# Patient Record
Sex: FEMALE | Race: WHITE | NOT HISPANIC OR LATINO | URBAN - METROPOLITAN AREA
[De-identification: names, ages, dates, MRNs, and addresses within clinical notes are randomized per-mention and may not be internally consistent; named-entity substitution may affect disease eponyms.]

---

## 2022-04-05 ENCOUNTER — TRANSCRIBE ORDERS (OUTPATIENT)
Dept: ADMINISTRATIVE | Facility: HOSPITAL | Age: 57
End: 2022-04-05

## 2022-04-05 DIAGNOSIS — K74.69 OTHER CIRRHOSIS OF LIVER: Primary | ICD-10-CM

## 2022-05-03 ENCOUNTER — TRANSCRIBE ORDERS (OUTPATIENT)
Dept: ADMINISTRATIVE | Facility: HOSPITAL | Age: 57
End: 2022-05-03

## 2022-05-03 DIAGNOSIS — K74.60 HEPATIC CIRRHOSIS, UNSPECIFIED HEPATIC CIRRHOSIS TYPE, UNSPECIFIED WHETHER ASCITES PRESENT: Primary | ICD-10-CM

## 2022-05-05 ENCOUNTER — APPOINTMENT (OUTPATIENT)
Dept: MRI IMAGING | Facility: HOSPITAL | Age: 57
End: 2022-05-05

## 2022-06-09 ENCOUNTER — APPOINTMENT (OUTPATIENT)
Dept: MRI IMAGING | Facility: HOSPITAL | Age: 57
End: 2022-06-09

## 2024-05-14 ENCOUNTER — HOSPITAL ENCOUNTER (OUTPATIENT)
Dept: OTHER | Facility: HOSPITAL | Age: 59
Discharge: HOME | End: 2024-05-14

## 2024-05-15 ENCOUNTER — OFFICE VISIT (OUTPATIENT)
Dept: NEUROSURGERY | Facility: CLINIC | Age: 59
End: 2024-05-15
Payer: MEDICARE

## 2024-05-15 VITALS
SYSTOLIC BLOOD PRESSURE: 104 MMHG | HEART RATE: 74 BPM | BODY MASS INDEX: 34.95 KG/M2 | HEIGHT: 60 IN | WEIGHT: 178 LBS | DIASTOLIC BLOOD PRESSURE: 82 MMHG

## 2024-05-15 DIAGNOSIS — M54.42 CHRONIC MIDLINE LOW BACK PAIN WITH BILATERAL SCIATICA: ICD-10-CM

## 2024-05-15 DIAGNOSIS — Z98.1 HISTORY OF FUSION OF CERVICAL SPINE: ICD-10-CM

## 2024-05-15 DIAGNOSIS — M48.02 SPINAL STENOSIS IN CERVICAL REGION: ICD-10-CM

## 2024-05-15 DIAGNOSIS — M47.816 LUMBAR FACET ARTHROPATHY: ICD-10-CM

## 2024-05-15 DIAGNOSIS — M54.41 CHRONIC MIDLINE LOW BACK PAIN WITH BILATERAL SCIATICA: ICD-10-CM

## 2024-05-15 DIAGNOSIS — M51.36 DDD (DEGENERATIVE DISC DISEASE), LUMBAR: Primary | ICD-10-CM

## 2024-05-15 DIAGNOSIS — G89.29 CHRONIC MIDLINE LOW BACK PAIN WITH BILATERAL SCIATICA: ICD-10-CM

## 2024-05-15 DIAGNOSIS — M50.222 HERNIATED NUCLEUS PULPOSUS, C5-6: ICD-10-CM

## 2024-05-15 DIAGNOSIS — R20.0 BILATERAL NUMBNESS AND TINGLING OF ARMS AND LEGS: ICD-10-CM

## 2024-05-15 DIAGNOSIS — R26.89 STUMBLING GAIT: ICD-10-CM

## 2024-05-15 DIAGNOSIS — R20.2 BILATERAL NUMBNESS AND TINGLING OF ARMS AND LEGS: ICD-10-CM

## 2024-05-15 PROCEDURE — 1160F RVW MEDS BY RX/DR IN RCRD: CPT | Performed by: PHYSICIAN ASSISTANT

## 2024-05-15 PROCEDURE — 1159F MED LIST DOCD IN RCRD: CPT | Performed by: PHYSICIAN ASSISTANT

## 2024-05-15 PROCEDURE — 99204 OFFICE O/P NEW MOD 45 MIN: CPT | Performed by: PHYSICIAN ASSISTANT

## 2024-05-15 RX ORDER — OMEPRAZOLE 40 MG/1
40 CAPSULE, DELAYED RELEASE ORAL
COMMUNITY
Start: 2024-05-08

## 2024-05-15 RX ORDER — DESVENLAFAXINE SUCCINATE 50 MG/1
50 TABLET, EXTENDED RELEASE ORAL DAILY
COMMUNITY
Start: 2024-04-18 | End: 2025-04-19

## 2024-05-15 RX ORDER — NAPROXEN 500 MG/1
500 TABLET ORAL
COMMUNITY

## 2024-05-15 RX ORDER — LANOLIN ALCOHOL/MO/W.PET/CERES
400 CREAM (GRAM) TOPICAL DAILY
COMMUNITY
Start: 2023-09-25 | End: 2024-09-25

## 2024-05-15 RX ORDER — PENICILLIN V POTASSIUM 500 MG/1
TABLET ORAL
COMMUNITY

## 2024-05-15 RX ORDER — BUSPIRONE HYDROCHLORIDE 15 MG/1
1 TABLET ORAL 2 TIMES DAILY
COMMUNITY

## 2024-05-15 RX ORDER — ONDANSETRON HYDROCHLORIDE 8 MG/1
8 TABLET, FILM COATED ORAL
COMMUNITY
Start: 2024-04-18 | End: 2024-06-18

## 2024-05-15 RX ORDER — OXYCODONE HYDROCHLORIDE 10 MG/1
10 TABLET ORAL
COMMUNITY
Start: 2024-02-26

## 2024-05-15 RX ORDER — CYCLOBENZAPRINE HCL 10 MG
TABLET ORAL
COMMUNITY
Start: 2024-02-26

## 2024-05-15 RX ORDER — SEMAGLUTIDE 1.34 MG/ML
INJECTION, SOLUTION SUBCUTANEOUS
COMMUNITY
Start: 2024-05-09

## 2024-05-15 RX ORDER — GABAPENTIN 800 MG/1
TABLET ORAL
COMMUNITY
Start: 2024-02-26

## 2024-05-15 RX ORDER — LANOLIN ALCOHOL/MO/W.PET/CERES
1000 CREAM (GRAM) TOPICAL DAILY
COMMUNITY
Start: 2023-09-27 | End: 2024-09-27

## 2024-05-15 RX ORDER — TRAZODONE HYDROCHLORIDE 100 MG/1
100 TABLET ORAL
COMMUNITY
Start: 2024-04-17

## 2024-05-15 NOTE — PROGRESS NOTES
"Chief Complaint  Back Pain, Hip Pain (Bilateral ), Leg Pain (Bilateral hips to above knees), Numbness (Bilateral feet), Tingling (Bilateral feet ), and Extremity Weakness (Bilateral legs )    Subjective          Caro Kevin who is a 58 y.o. year old female who presents to [facility] for Evaluation of the Spine.     The patient complains of pain located in the Lumbar Spine.  Patients states the pain has been present for some time.  The pain came on gradually.  The pain scaled level is 9.  The pain does radiate. Dermatomes are located bilaterally Lumbar at: not below the knee.  The pain in the back is constant and waxing/waning, leg pain prompted by movement and standing up. Pain  described as sharp.  The pain is worse at no particular time of day. Patient states Pain Medication and Gabapentin makes the pain better.  Patient states  standing, walking, trying to do anything that requires balancing on the legs makes the pain worse.    Associated Symptoms Include: Numbness and Tingling in both legs/feet. Weakness in the bilateral lower extremities mostly when standing. \"Stumbling\" when walking - ongoing for the past 1.5 years.  Conservative Interventions Include: Pain Medications that were somewhat effective. and Gabapentin that was somewhat effective. NSAIDs and Muscle relaxers somewhat effective. Physical therapy was ineffective. Spinal epidurals in the past were ineffective. TPI were somewhat effective.    Was this the result of an injury or accident? : No    History of Previous Spinal Surgery?: Yes.  Cervical, Date 2007, C3-C4 (she thinks).     reports that she has never smoked. She does not have any smokeless tobacco history on file.    Review of Systems   Musculoskeletal:  Positive for back pain.        Objective   Vital Signs:   /82 (BP Location: Right arm, Patient Position: Sitting, Cuff Size: Adult)   Pulse 74   Ht 152.4 cm (60\")   Wt 80.7 kg (178 lb)   BMI " 34.76 kg/m²       Physical Exam  Constitutional:       Appearance: Normal appearance. She is obese.   Neck:      Comments: Pain with ROM  Pulmonary:      Effort: Pulmonary effort is normal.   Musculoskeletal:         General: Tenderness (midline and bilateral lumbar and cervical areas) present.      Comments: Tinel's test positive at right wrist and elbow,  Rodriguez's and clonus negative bilaterally   Neurological:      General: No focal deficit present.      Mental Status: She is alert and oriented to person, place, and time.      Sensory: Sensory deficit (reduced in the thighs bilaterally) present.      Motor: No weakness.      Deep Tendon Reflexes: Reflexes normal.   Psychiatric:         Mood and Affect: Mood normal.         Behavior: Behavior normal.        Neurologic Exam     Mental Status   Oriented to person, place, and time.        Result Review     I have personally interpreted the MRI of the lumbar spine without contrast from 8/30/2023 which shows mild multilevel spondylosis with multilevel facet arthritis.  There is no significant central canal or foraminal stenosis.  There is mild foraminal stenosis most notable on the left at L4-L5.    I have personally interpreted the MRI of the cervical spine without contrast from 8/30/23 which shows previous ACDF at C3-C4 and C4-C5. There is disc bulging at C5-C6 which may contact the spinal cord. There is questionable signal change in the spinal cord just below this level on the sagittal view - there were no axial views included.     Assessment and Plan    Diagnoses and all orders for this visit:    1. DDD (degenerative disc disease), lumbar (Primary)    2. Lumbar facet arthropathy    3. Chronic midline low back pain with bilateral sciatica    4. Stumbling gait  -     MRI Cervical Spine Without Contrast; Future    5. Bilateral numbness and tingling of arms and legs  -     MRI Cervical Spine Without Contrast; Future    6. History of fusion of cervical spine  -     MRI  "Cervical Spine Without Contrast; Future    7. Herniated nucleus pulposus, C5-6  -     MRI Cervical Spine Without Contrast; Future    8. Spinal stenosis in cervical region  -     MRI Cervical Spine Without Contrast; Future    There is not significant stenosis in the lumbar spine to recommend a surgical approach.    She may consider spinal injections in the lumbar spine to assess benefit for her leg complaints.    She does complain of \"stumbling\" when she walks and new numbness/tingling complaints in the arms and legs. She has history of C3-C5 fusion and now has disc bulging at C5-C6 which may contact the spinal cord. There may be some signal change in the spinal cord at this level in the sagittal view - but the cannot be confirmed in an axial view as the axial images are not provided.    Given her complaints and history of surgery, I would like to order a new MRI fo the cervical spine to assess with axial images the spinal cord at C5-C6 to further evaluate for myelomalacia.    She has no signs of myelopathy on examination today.    She will follow-up in 4 weeks to reassess and review imaging, sooner if needed.    Follow Up   Return in about 4 weeks (around 6/12/2024).  Patient was given instructions and counseling regarding her condition or for health maintenance advice. Please see specific information pulled into the AVS if appropriate.        "

## 2024-05-17 ENCOUNTER — PATIENT ROUNDING (BHMG ONLY) (OUTPATIENT)
Dept: NEUROSURGERY | Facility: CLINIC | Age: 59
End: 2024-05-17
Payer: MEDICARE

## 2024-06-17 ENCOUNTER — HOSPITAL ENCOUNTER (OUTPATIENT)
Dept: MRI IMAGING | Facility: HOSPITAL | Age: 59
Discharge: HOME | End: 2024-06-17
Admitting: PHYSICIAN ASSISTANT
Payer: MEDICARE

## 2024-06-17 DIAGNOSIS — R26.89 STUMBLING GAIT: ICD-10-CM

## 2024-06-17 DIAGNOSIS — M50.222 HERNIATED NUCLEUS PULPOSUS, C5-6: ICD-10-CM

## 2024-06-17 DIAGNOSIS — M48.02 SPINAL STENOSIS IN CERVICAL REGION: ICD-10-CM

## 2024-06-17 DIAGNOSIS — R20.0 BILATERAL NUMBNESS AND TINGLING OF ARMS AND LEGS: ICD-10-CM

## 2024-06-17 DIAGNOSIS — Z98.1 HISTORY OF FUSION OF CERVICAL SPINE: ICD-10-CM

## 2024-06-17 DIAGNOSIS — R20.2 BILATERAL NUMBNESS AND TINGLING OF ARMS AND LEGS: ICD-10-CM

## 2024-06-17 PROCEDURE — 72141 MRI NECK SPINE W/O DYE: CPT

## 2024-06-18 ENCOUNTER — OFFICE VISIT (OUTPATIENT)
Dept: NEUROSURGERY | Facility: CLINIC | Age: 59
End: 2024-06-18
Payer: MEDICARE

## 2024-06-18 VITALS
HEIGHT: 60 IN | DIASTOLIC BLOOD PRESSURE: 71 MMHG | BODY MASS INDEX: 34.16 KG/M2 | SYSTOLIC BLOOD PRESSURE: 133 MMHG | WEIGHT: 174 LBS | HEART RATE: 68 BPM

## 2024-06-18 DIAGNOSIS — M48.02 SPINAL STENOSIS IN CERVICAL REGION: ICD-10-CM

## 2024-06-18 DIAGNOSIS — Z98.1 HISTORY OF FUSION OF CERVICAL SPINE: ICD-10-CM

## 2024-06-18 DIAGNOSIS — M50.222 HERNIATED NUCLEUS PULPOSUS, C5-6: Primary | ICD-10-CM

## 2024-06-18 PROCEDURE — 1160F RVW MEDS BY RX/DR IN RCRD: CPT | Performed by: PHYSICIAN ASSISTANT

## 2024-06-18 PROCEDURE — 1159F MED LIST DOCD IN RCRD: CPT | Performed by: PHYSICIAN ASSISTANT

## 2024-06-18 PROCEDURE — 99213 OFFICE O/P EST LOW 20 MIN: CPT | Performed by: PHYSICIAN ASSISTANT

## 2024-06-18 NOTE — PROGRESS NOTES
"Patient being seen for today for Follow-up  .    Subjective    Caro Kevin is a 59 y.o. female that presents with Follow-up  .    HPI  Previously: Last seen on 5/15/2024 for complaints of back pain extending bilaterally not below the knees.  There is no significant stenosis in the lumbar spine to recommend surgery.  We discussed possible spinal injections.  She complained of \"stumbling\" when she walked with history of fusion at C3-C5 and current disc bulging at C5-C6 possibly contacting the spinal cord.  There was questionable signal change in sagittal view but axial images were not provided.  There was an order for new MRI of the cervical spine to fully assess the cervical spine changes and evaluate for myelomalacia.  There were no signs of myelopathy on her examination in the clinic.    Today: She denies any new complaints today.    She reports maybe worse weakness in legs and being off balance.    She has pain in the arms and hands to all the fingers.     reports that she has never smoked. She does not have any smokeless tobacco history on file.    Review of Systems   Musculoskeletal:  Positive for back pain and neck pain.   Neurological:  Positive for weakness and numbness.       Objective   Vitals:    06/18/24 1325   BP: 133/71   Pulse: 68        Physical Exam  Constitutional:       Appearance: Normal appearance. She is obese.   Neck:      Comments: Pain with ROM  Pulmonary:      Effort: Pulmonary effort is normal.   Musculoskeletal:         General: Tenderness (midline and bilateral cervical area) present.      Comments: Tinel's positive at the bilateral wrists, and right elbows.  Hoffmans' negative bilaterally,  Clonus negative bilaterally   Neurological:      General: No focal deficit present.      Mental Status: She is alert and oriented to person, place, and time.      Sensory: No sensory deficit.      Motor: No weakness.      Deep Tendon Reflexes: Reflexes normal.   Psychiatric:         Mood and Affect: " Mood normal.         Behavior: Behavior normal.          Result Review   I have personally interpreted the MRI of cervical spine without contrast from 6/17/2024 which shows previous ACDF from C3-C5.  There appears to be disc bulging at C5-C6 with central canal narrowing and possibly spinal cord signal change.     Assessment and Plan {CC Problem List  Visit Diagnosis  ROS  Review (Popup)  [facility]  Quality  BestPractice  Medications  SmartSets  SnapShot Encounters  Media :23}   Diagnoses and all orders for this visit:    1. Herniated nucleus pulposus, C5-6 (Primary)  -     EMG & Nerve Conduction Test; Future    2. Spinal stenosis in cervical region  -     EMG & Nerve Conduction Test; Future    3. History of fusion of cervical spine  -     EMG & Nerve Conduction Test; Future    She has pain in the neck and both arms to all the fingers.    She reports stumbling while walking at times.    She did compete new MRI which does appear to show stenosis at C5-C6 below her fusion level. There is questionable spinal cord signal change at this level.    She has no signs of myelopathy on examination today. We are waiting on the radiologist to finalize their report and I will contact her with those findings once available. I did offer her discussion with Dr. Joshi about possible surgical approach, which she is deferring today in favor of further testing.    She does have positive tinel's testing bilateral wrists and the right elbow. I will order NCV/EMG testing of the bilateral upper extremities.    She will follow-up after NCV/EMG testing to re-evaluate, sooner if needed. I will notify her of the radiology findings for the cervical MRI - if the radiologist agrees that there is signal change, we will discuss earlier follow-up to consult Dr. Joshi about surgery if she is agreeable.    Follow Up {Instructions Charge Capture  Follow-up Communications :23}   Return in about 4 weeks (around 7/16/2024).

## 2024-06-19 ENCOUNTER — TELEPHONE (OUTPATIENT)
Dept: NEUROSURGERY | Facility: CLINIC | Age: 59
End: 2024-06-19
Payer: MEDICARE

## 2024-06-19 NOTE — TELEPHONE ENCOUNTER
Caro has been advised that the radiologist did finalize their report for her neck MRI and they do agree that there is spinal cord signal change at the C5-C6 level below her previous fusion.     As we discussed at her office visit earlier, she can consider follow-up to discuss a surgical approach with Dr. Joshi if she would like.    Caro danica like to follow-up to discuss a surgucal approach with Dr. Joshi. Patient is aware that she is on Jonathan Lopez's schedule and she will see Dr. Joshi between his patients.

## 2024-06-26 ENCOUNTER — HOSPITAL ENCOUNTER (OUTPATIENT)
Facility: HOSPITAL | Age: 59
Discharge: HOME | End: 2024-06-26
Admitting: PHYSICIAN ASSISTANT
Payer: MEDICARE

## 2024-06-26 DIAGNOSIS — Z98.1 HISTORY OF FUSION OF CERVICAL SPINE: ICD-10-CM

## 2024-06-26 DIAGNOSIS — M50.222 HERNIATED NUCLEUS PULPOSUS, C5-6: ICD-10-CM

## 2024-06-26 DIAGNOSIS — M48.02 SPINAL STENOSIS IN CERVICAL REGION: ICD-10-CM

## 2024-06-26 PROCEDURE — 95886 MUSC TEST DONE W/N TEST COMP: CPT

## 2024-06-26 PROCEDURE — 95911 NRV CNDJ TEST 9-10 STUDIES: CPT

## 2024-06-27 ENCOUNTER — HOSPITAL ENCOUNTER (OUTPATIENT)
Dept: GENERAL RADIOLOGY | Facility: HOSPITAL | Age: 59
Discharge: HOME | End: 2024-06-27
Admitting: NEUROLOGICAL SURGERY
Payer: MEDICARE

## 2024-06-27 ENCOUNTER — OFFICE VISIT (OUTPATIENT)
Dept: NEUROSURGERY | Facility: CLINIC | Age: 59
End: 2024-06-27
Payer: MEDICARE

## 2024-06-27 VITALS
DIASTOLIC BLOOD PRESSURE: 98 MMHG | WEIGHT: 175 LBS | BODY MASS INDEX: 34.36 KG/M2 | HEART RATE: 91 BPM | SYSTOLIC BLOOD PRESSURE: 130 MMHG | HEIGHT: 60 IN

## 2024-06-27 DIAGNOSIS — Z98.1 HISTORY OF FUSION OF CERVICAL SPINE: ICD-10-CM

## 2024-06-27 DIAGNOSIS — M50.222 HERNIATED NUCLEUS PULPOSUS, C5-6: Primary | ICD-10-CM

## 2024-06-27 DIAGNOSIS — M48.02 SPINAL STENOSIS IN CERVICAL REGION: ICD-10-CM

## 2024-06-27 PROCEDURE — 1159F MED LIST DOCD IN RCRD: CPT | Performed by: PHYSICIAN ASSISTANT

## 2024-06-27 PROCEDURE — 72040 X-RAY EXAM NECK SPINE 2-3 VW: CPT

## 2024-06-27 PROCEDURE — 1160F RVW MEDS BY RX/DR IN RCRD: CPT | Performed by: PHYSICIAN ASSISTANT

## 2024-06-27 PROCEDURE — 99214 OFFICE O/P EST MOD 30 MIN: CPT | Performed by: PHYSICIAN ASSISTANT

## 2024-06-27 NOTE — PROGRESS NOTES
Patient being seen for today for Follow-up  .    Subjective    Caro Kevin is a 59 y.o. female that presents with Follow-up  .    HPI  Previously: Last seen on 6/18/2024 for neck pain and bilateral arm pains all the fingers.  She reported stumbling while walking at times.  There was a new MRI of the cervical spine showing stenosis at C5-C6 below her fusion level.  There is questionable spinal cord signal change at this level.  She had no signs of myelopathy on examination.    Today: She denies new complaints. She did have NCV/EMG testing yesterday.     reports that she is a non-smoker but has been exposed to tobacco smoke. She does not have any smokeless tobacco history on file.    Review of Systems   Musculoskeletal:  Positive for neck pain.       Objective   Vitals:    06/27/24 1329   BP: 130/98   Pulse: 91        Physical Exam  Constitutional:       Appearance: Normal appearance. She is obese.   Neck:      Comments: Pain with ROM  Pulmonary:      Effort: Pulmonary effort is normal.   Musculoskeletal:         General: Tenderness (midline and bilateral cervical area) present.      Comments: Rodriguez's negative bilaterally   Neurological:      General: No focal deficit present.      Mental Status: She is alert and oriented to person, place, and time.      Sensory: No sensory deficit.      Motor: Weakness (right greater than left hand ) present.      Deep Tendon Reflexes: Reflexes normal.   Psychiatric:         Mood and Affect: Mood normal.         Behavior: Behavior normal.          Result Review   I personally interpreted the MRI of the cervical spine without contrast from 6/17/2024 which shows previous ACDF at C3-C5.  There is disc bulging at C5-C6 with severe canal stenosis and T2 signal change in the spinal cord.    NCV/EMG test showed severe right and mild left median neuropathy.     Assessment and Plan {CC Problem List  Visit Diagnosis  ROS  Review (Popup)  Health Maintenance  Quality  BestPractice   Medications  SmartSets  SnapShot Encounters  Media :23}   Diagnoses and all orders for this visit:    1. Herniated nucleus pulposus, C5-6 (Primary)    2. Spinal stenosis in cervical region  -     Case Request; Standing  -     Follow Anesthesia Guidelines / Protocol; Standing  -     Obtain informed consent; Standing  -     SCD (sequential compression device)- to be placed on patient in Pre-op; Standing  -     Verify / Perform Chlorhexidine Skin Prep; Standing  -     ceFAZolin (ANCEF) 2 g in sodium chloride 0.9 % 100 mL IVPB  -     Case Request    3. History of fusion of cervical spine  -     XR Spine Cervical 2 or 3 View; Future  -     Case Request; Standing  -     Follow Anesthesia Guidelines / Protocol; Standing  -     Obtain informed consent; Standing  -     SCD (sequential compression device)- to be placed on patient in Pre-op; Standing  -     Verify / Perform Chlorhexidine Skin Prep; Standing  -     ceFAZolin (ANCEF) 2 g in sodium chloride 0.9 % 100 mL IVPB  -     Case Request    She has bilateral arm pain to all the fingers. She reports stumbling while walking at times.    The MRI shows disc bulging below previous cervical fusion from C3-C5, at the C5-C6 level. There is T2 spinal cord signal change at the C5-C6 level.    She did discuss possible surgical approach with Dr. Joshi today including the risks, benefit and alternatives. She will pursue ACDF at C5-C6 using a right approach with removal of outside instrumentation.    Follow Up {Instructions Charge Capture  Follow-up Communications :23}   Return for Surgery and postop.

## 2024-08-01 RX ORDER — ONDANSETRON HYDROCHLORIDE 8 MG/1
8 TABLET, FILM COATED ORAL EVERY 8 HOURS PRN
COMMUNITY

## 2024-08-01 RX ORDER — BUSPIRONE HYDROCHLORIDE 10 MG/1
5 TABLET ORAL 3 TIMES DAILY
COMMUNITY

## 2024-08-01 RX ORDER — CARVEDILOL 12.5 MG/1
12.5 TABLET ORAL 2 TIMES DAILY WITH MEALS
COMMUNITY
Start: 2024-07-09 | End: 2025-07-09

## 2024-08-01 NOTE — PRE-PROCEDURE INSTRUCTIONS
IMPORTANT INSTRUCTIONS - PRE-ADMISSION TESTING  DO NOT EAT OR CHEW anything after midnight the night before your procedure.    HX OF GASTRIC SLEEVE AND OZEMPIC USE ONLY SIPS OF WATER TO TAKE MEDICATIONS LISTED BELOW  Take the following medications the morning of your procedure with JUST A SIP OF WATER:  __BUSPAR, CARVEDILOL, CYCLOBENZAPRINE,PRISTIQ, GABAPENTIN, OMEPRAZOLE, ZOFRAN IF NEEDED. OXYCODONE, _____________________________________________________________________________________________________________________________________________________________________________________    DO NOT BRING your medications to the hospital with you, UNLESS something has changed since your PRE-Admission Testing appointment.  Hold all vitamins, supplements, and NSAIDS (Non- steroidal anti-inflammatory meds) for one week prior to surgery (you MAY take Tylenol or Acetaminophen).  If you are diabetic, check your blood sugar the morning of your procedure. If it is less than 70 or if you are feeling symptomatic, call the following number for further instructions: [phone] _______.  Use your inhalers/nebulizers as usual, the morning of your procedure. BRING YOUR INHALERS with you.   Bring your CPAP or BIPAP to hospital, ONLY IF YOU WILL BE SPENDING THE NIGHT.   Make sure you have a ride home and have someone who will stay with you the day of your procedure after you go home.  If you have any questions, please call your Pre-Admission Testing Nurse, ___IMANI_____________ at [phone]____________.   Per anesthesia request, do not smoke for 24 hours before your procedure or as instructed by your surgeon.    WILL CALL ON    8/1/24     NORMALLY BETWEEN 1 AND 4 PM TO GIVE OFFICIAL ARRIVAL TIME FOR DAY OF PROCEDURE  BATHING INSTRUCTIONS GIVEN. NO JEWELRY OF ANY TYPE OR NAIL POLISH UPPER OR LOWER EXT  COME TO ENTRANCE A, ELEVATOR A, 3RD FLOOR DAY OF PROCEDURE.   NO VAPING 24 HOURS PRIOR TO PROCEDURE  DO NOT TAKE ANOTHER DOSE OF OZEMPIC  PRIOR TO PROCEDURE ON 8/5/24

## 2024-08-04 ENCOUNTER — ANESTHESIA EVENT (OUTPATIENT)
Dept: PERIOP | Facility: HOSPITAL | Age: 59
End: 2024-08-04
Payer: MEDICARE

## 2024-08-05 ENCOUNTER — APPOINTMENT (OUTPATIENT)
Dept: GENERAL RADIOLOGY | Facility: HOSPITAL | Age: 59
End: 2024-08-05
Payer: MEDICARE

## 2024-08-05 ENCOUNTER — ANESTHESIA (OUTPATIENT)
Dept: PERIOP | Facility: HOSPITAL | Age: 59
End: 2024-08-05
Payer: MEDICARE

## 2024-08-05 ENCOUNTER — HOSPITAL ENCOUNTER (OUTPATIENT)
Facility: HOSPITAL | Age: 59
Discharge: HOME | End: 2024-08-06
Attending: NEUROLOGICAL SURGERY | Admitting: NEUROLOGICAL SURGERY
Payer: MEDICARE

## 2024-08-05 DIAGNOSIS — Z98.1 HISTORY OF FUSION OF CERVICAL SPINE: ICD-10-CM

## 2024-08-05 DIAGNOSIS — M48.02 SPINAL STENOSIS IN CERVICAL REGION: ICD-10-CM

## 2024-08-05 DIAGNOSIS — Z98.1 STATUS POST CERVICAL SPINAL FUSION: Primary | ICD-10-CM

## 2024-08-05 LAB — GLUCOSE BLDC GLUCOMTR-MCNC: 90 MG/DL (ref 70–99)

## 2024-08-05 PROCEDURE — S0260 H&P FOR SURGERY: HCPCS | Performed by: NEUROLOGICAL SURGERY

## 2024-08-05 PROCEDURE — C1713 ANCHOR/SCREW BN/BN,TIS/BN: HCPCS | Performed by: NEUROLOGICAL SURGERY

## 2024-08-05 PROCEDURE — 25010000002 ONDANSETRON PER 1 MG

## 2024-08-05 PROCEDURE — 93005 ELECTROCARDIOGRAM TRACING: CPT | Performed by: ANESTHESIOLOGY

## 2024-08-05 PROCEDURE — 25010000002 PROPOFOL 200 MG/20ML EMULSION

## 2024-08-05 PROCEDURE — 22855 REMOVAL ANTERIOR INSTRMJ: CPT | Performed by: SPECIALIST/TECHNOLOGIST, OTHER

## 2024-08-05 PROCEDURE — 22845 INSERT SPINE FIXATION DEVICE: CPT | Performed by: NEUROLOGICAL SURGERY

## 2024-08-05 PROCEDURE — 25010000002 DEXAMETHASONE PER 1 MG

## 2024-08-05 PROCEDURE — 25010000002 HYDROMORPHONE 1 MG/ML SOLUTION

## 2024-08-05 PROCEDURE — 25810000003 LACTATED RINGERS PER 1000 ML: Performed by: ANESTHESIOLOGY

## 2024-08-05 PROCEDURE — 25010000002 CEFAZOLIN PER 500 MG: Performed by: NEUROLOGICAL SURGERY

## 2024-08-05 PROCEDURE — 22855 REMOVAL ANTERIOR INSTRMJ: CPT | Performed by: NEUROLOGICAL SURGERY

## 2024-08-05 PROCEDURE — 25010000002 SUGAMMADEX 200 MG/2ML SOLUTION

## 2024-08-05 PROCEDURE — 25010000002 FENTANYL CITRATE (PF) 50 MCG/ML SOLUTION

## 2024-08-05 PROCEDURE — 22551 ARTHRD ANT NTRBDY CERVICAL: CPT | Performed by: SPECIALIST/TECHNOLOGIST, OTHER

## 2024-08-05 PROCEDURE — 76000 FLUOROSCOPY <1 HR PHYS/QHP: CPT

## 2024-08-05 PROCEDURE — 94761 N-INVAS EAR/PLS OXIMETRY MLT: CPT

## 2024-08-05 PROCEDURE — 22551 ARTHRD ANT NTRBDY CERVICAL: CPT | Performed by: NEUROLOGICAL SURGERY

## 2024-08-05 PROCEDURE — C1894 INTRO/SHEATH, NON-LASER: HCPCS | Performed by: NEUROLOGICAL SURGERY

## 2024-08-05 PROCEDURE — 22845 INSERT SPINE FIXATION DEVICE: CPT | Performed by: SPECIALIST/TECHNOLOGIST, OTHER

## 2024-08-05 PROCEDURE — 93010 ELECTROCARDIOGRAM REPORT: CPT | Performed by: INTERNAL MEDICINE

## 2024-08-05 PROCEDURE — 94799 UNLISTED PULMONARY SVC/PX: CPT

## 2024-08-05 PROCEDURE — 25010000002 MIDAZOLAM PER 1MG: Performed by: ANESTHESIOLOGY

## 2024-08-05 PROCEDURE — 20931 SP BONE ALGRFT STRUCT ADD-ON: CPT | Performed by: NEUROLOGICAL SURGERY

## 2024-08-05 PROCEDURE — 82948 REAGENT STRIP/BLOOD GLUCOSE: CPT | Performed by: ANESTHESIOLOGY

## 2024-08-05 DEVICE — IMPLANTABLE DEVICE: Type: IMPLANTABLE DEVICE | Site: SPINE CERVICAL | Status: FUNCTIONAL

## 2024-08-05 DEVICE — SCRW SKYLINE VAR SD 12MM: Type: IMPLANTABLE DEVICE | Site: SPINE CERVICAL | Status: FUNCTIONAL

## 2024-08-05 DEVICE — PLT SKYLINE 1LEVEL 14MM: Type: IMPLANTABLE DEVICE | Site: SPINE CERVICAL | Status: FUNCTIONAL

## 2024-08-05 DEVICE — ALLOGRFT SPINE CERV VERTIGRAFT WEDGE FZ 7DEG PRESERV 5.75MM: Type: IMPLANTABLE DEVICE | Site: SPINE CERVICAL | Status: FUNCTIONAL

## 2024-08-05 RX ORDER — HYDROCODONE BITARTRATE AND ACETAMINOPHEN 7.5; 325 MG/1; MG/1
1 TABLET ORAL EVERY 4 HOURS PRN
Status: DISCONTINUED | OUTPATIENT
Start: 2024-08-05 | End: 2024-08-06 | Stop reason: HOSPADM

## 2024-08-05 RX ORDER — ONDANSETRON 2 MG/ML
4 INJECTION INTRAMUSCULAR; INTRAVENOUS EVERY 6 HOURS PRN
Status: DISCONTINUED | OUTPATIENT
Start: 2024-08-05 | End: 2024-08-06 | Stop reason: HOSPADM

## 2024-08-05 RX ORDER — ACETAMINOPHEN 500 MG
1000 TABLET ORAL ONCE
Status: COMPLETED | OUTPATIENT
Start: 2024-08-05 | End: 2024-08-05

## 2024-08-05 RX ORDER — BISACODYL 10 MG
10 SUPPOSITORY, RECTAL RECTAL DAILY PRN
Status: DISCONTINUED | OUTPATIENT
Start: 2024-08-05 | End: 2024-08-06 | Stop reason: HOSPADM

## 2024-08-05 RX ORDER — MAGNESIUM HYDROXIDE 1200 MG/15ML
LIQUID ORAL AS NEEDED
Status: DISCONTINUED | OUTPATIENT
Start: 2024-08-05 | End: 2024-08-05 | Stop reason: HOSPADM

## 2024-08-05 RX ORDER — CARVEDILOL 6.25 MG/1
12.5 TABLET ORAL 2 TIMES DAILY WITH MEALS
Status: DISCONTINUED | OUTPATIENT
Start: 2024-08-05 | End: 2024-08-06 | Stop reason: HOSPADM

## 2024-08-05 RX ORDER — PETROLATUM,WHITE
OINTMENT IN PACKET (GRAM) TOPICAL AS NEEDED
Status: DISCONTINUED | OUTPATIENT
Start: 2024-08-05 | End: 2024-08-05 | Stop reason: SURG

## 2024-08-05 RX ORDER — ONDANSETRON 2 MG/ML
4 INJECTION INTRAMUSCULAR; INTRAVENOUS ONCE AS NEEDED
Status: DISCONTINUED | OUTPATIENT
Start: 2024-08-05 | End: 2024-08-05

## 2024-08-05 RX ORDER — AMOXICILLIN 250 MG
2 CAPSULE ORAL 2 TIMES DAILY PRN
Status: DISCONTINUED | OUTPATIENT
Start: 2024-08-05 | End: 2024-08-06 | Stop reason: HOSPADM

## 2024-08-05 RX ORDER — LIDOCAINE HYDROCHLORIDE AND EPINEPHRINE 10; 10 MG/ML; UG/ML
INJECTION, SOLUTION INFILTRATION; PERINEURAL AS NEEDED
Status: DISCONTINUED | OUTPATIENT
Start: 2024-08-05 | End: 2024-08-05 | Stop reason: HOSPADM

## 2024-08-05 RX ORDER — PROMETHAZINE HYDROCHLORIDE 12.5 MG/1
25 TABLET ORAL ONCE AS NEEDED
Status: DISCONTINUED | OUTPATIENT
Start: 2024-08-05 | End: 2024-08-05

## 2024-08-05 RX ORDER — LIDOCAINE HYDROCHLORIDE 20 MG/ML
INJECTION, SOLUTION EPIDURAL; INFILTRATION; INTRACAUDAL; PERINEURAL AS NEEDED
Status: DISCONTINUED | OUTPATIENT
Start: 2024-08-05 | End: 2024-08-05 | Stop reason: SURG

## 2024-08-05 RX ORDER — BISACODYL 5 MG/1
5 TABLET, DELAYED RELEASE ORAL DAILY PRN
Status: DISCONTINUED | OUTPATIENT
Start: 2024-08-05 | End: 2024-08-06 | Stop reason: HOSPADM

## 2024-08-05 RX ORDER — OXYCODONE HYDROCHLORIDE 5 MG/1
5 TABLET ORAL
Status: DISCONTINUED | OUTPATIENT
Start: 2024-08-05 | End: 2024-08-05

## 2024-08-05 RX ORDER — ONDANSETRON 2 MG/ML
INJECTION INTRAMUSCULAR; INTRAVENOUS AS NEEDED
Status: DISCONTINUED | OUTPATIENT
Start: 2024-08-05 | End: 2024-08-05 | Stop reason: SURG

## 2024-08-05 RX ORDER — SODIUM CHLORIDE, SODIUM LACTATE, POTASSIUM CHLORIDE, CALCIUM CHLORIDE 600; 310; 30; 20 MG/100ML; MG/100ML; MG/100ML; MG/100ML
9 INJECTION, SOLUTION INTRAVENOUS CONTINUOUS PRN
Status: DISCONTINUED | OUTPATIENT
Start: 2024-08-05 | End: 2024-08-05

## 2024-08-05 RX ORDER — POLYETHYLENE GLYCOL 3350 17 G/17G
17 POWDER, FOR SOLUTION ORAL DAILY PRN
Status: DISCONTINUED | OUTPATIENT
Start: 2024-08-05 | End: 2024-08-06 | Stop reason: HOSPADM

## 2024-08-05 RX ORDER — SODIUM CHLORIDE 9 MG/ML
50 INJECTION, SOLUTION INTRAVENOUS CONTINUOUS
Status: DISCONTINUED | OUTPATIENT
Start: 2024-08-05 | End: 2024-08-06 | Stop reason: HOSPADM

## 2024-08-05 RX ORDER — UREA 10 %
1000 LOTION (ML) TOPICAL DAILY
Status: DISCONTINUED | OUTPATIENT
Start: 2024-08-05 | End: 2024-08-06 | Stop reason: HOSPADM

## 2024-08-05 RX ORDER — DEXAMETHASONE SODIUM PHOSPHATE 4 MG/ML
INJECTION, SOLUTION INTRA-ARTICULAR; INTRALESIONAL; INTRAMUSCULAR; INTRAVENOUS; SOFT TISSUE AS NEEDED
Status: DISCONTINUED | OUTPATIENT
Start: 2024-08-05 | End: 2024-08-05 | Stop reason: SURG

## 2024-08-05 RX ORDER — PROPOFOL 10 MG/ML
INJECTION, EMULSION INTRAVENOUS AS NEEDED
Status: DISCONTINUED | OUTPATIENT
Start: 2024-08-05 | End: 2024-08-05 | Stop reason: SURG

## 2024-08-05 RX ORDER — HYDROCODONE BITARTRATE AND ACETAMINOPHEN 5; 325 MG/1; MG/1
1 TABLET ORAL EVERY 4 HOURS PRN
Status: DISCONTINUED | OUTPATIENT
Start: 2024-08-05 | End: 2024-08-06 | Stop reason: HOSPADM

## 2024-08-05 RX ORDER — TRAZODONE HYDROCHLORIDE 100 MG/1
100 TABLET ORAL NIGHTLY
Status: DISCONTINUED | OUTPATIENT
Start: 2024-08-05 | End: 2024-08-06 | Stop reason: HOSPADM

## 2024-08-05 RX ORDER — NALOXONE HCL 0.4 MG/ML
0.4 VIAL (ML) INJECTION
Status: DISCONTINUED | OUTPATIENT
Start: 2024-08-05 | End: 2024-08-06 | Stop reason: HOSPADM

## 2024-08-05 RX ORDER — PROMETHAZINE HYDROCHLORIDE 25 MG/1
25 SUPPOSITORY RECTAL ONCE AS NEEDED
Status: DISCONTINUED | OUTPATIENT
Start: 2024-08-05 | End: 2024-08-05

## 2024-08-05 RX ORDER — ACETAMINOPHEN 650 MG/1
650 SUPPOSITORY RECTAL EVERY 8 HOURS
Status: DISCONTINUED | OUTPATIENT
Start: 2024-08-05 | End: 2024-08-06 | Stop reason: HOSPADM

## 2024-08-05 RX ORDER — LANOLIN ALCOHOL/MO/W.PET/CERES
400 CREAM (GRAM) TOPICAL DAILY
Status: DISCONTINUED | OUTPATIENT
Start: 2024-08-05 | End: 2024-08-06 | Stop reason: HOSPADM

## 2024-08-05 RX ORDER — SCOLOPAMINE TRANSDERMAL SYSTEM 1 MG/1
1 PATCH, EXTENDED RELEASE TRANSDERMAL ONCE
Status: DISCONTINUED | OUTPATIENT
Start: 2024-08-05 | End: 2024-08-05

## 2024-08-05 RX ORDER — OXYCODONE HYDROCHLORIDE 5 MG/1
10 TABLET ORAL 4 TIMES DAILY
Status: DISCONTINUED | OUTPATIENT
Start: 2024-08-05 | End: 2024-08-06 | Stop reason: HOSPADM

## 2024-08-05 RX ORDER — ACETAMINOPHEN 325 MG/1
650 TABLET ORAL EVERY 8 HOURS
Status: DISCONTINUED | OUTPATIENT
Start: 2024-08-05 | End: 2024-08-06 | Stop reason: HOSPADM

## 2024-08-05 RX ORDER — DEXMEDETOMIDINE HYDROCHLORIDE 4 UG/ML
INJECTION, SOLUTION INTRAVENOUS AS NEEDED
Status: DISCONTINUED | OUTPATIENT
Start: 2024-08-05 | End: 2024-08-05 | Stop reason: SURG

## 2024-08-05 RX ORDER — NYSTATIN 100000 [USP'U]/G
POWDER TOPICAL EVERY 12 HOURS SCHEDULED
Status: DISCONTINUED | OUTPATIENT
Start: 2024-08-05 | End: 2024-08-06 | Stop reason: HOSPADM

## 2024-08-05 RX ORDER — ROCURONIUM BROMIDE 10 MG/ML
INJECTION, SOLUTION INTRAVENOUS AS NEEDED
Status: DISCONTINUED | OUTPATIENT
Start: 2024-08-05 | End: 2024-08-05 | Stop reason: SURG

## 2024-08-05 RX ORDER — FENTANYL CITRATE 50 UG/ML
INJECTION, SOLUTION INTRAMUSCULAR; INTRAVENOUS AS NEEDED
Status: DISCONTINUED | OUTPATIENT
Start: 2024-08-05 | End: 2024-08-05 | Stop reason: SURG

## 2024-08-05 RX ORDER — CYCLOBENZAPRINE HCL 10 MG
10 TABLET ORAL 3 TIMES DAILY PRN
Status: DISCONTINUED | OUTPATIENT
Start: 2024-08-05 | End: 2024-08-06 | Stop reason: HOSPADM

## 2024-08-05 RX ORDER — SODIUM CHLORIDE 9 MG/ML
40 INJECTION, SOLUTION INTRAVENOUS AS NEEDED
Status: DISCONTINUED | OUTPATIENT
Start: 2024-08-05 | End: 2024-08-06 | Stop reason: HOSPADM

## 2024-08-05 RX ORDER — MIDAZOLAM HYDROCHLORIDE 2 MG/2ML
1 INJECTION, SOLUTION INTRAMUSCULAR; INTRAVENOUS ONCE
Status: COMPLETED | OUTPATIENT
Start: 2024-08-05 | End: 2024-08-05

## 2024-08-05 RX ORDER — BUSPIRONE HYDROCHLORIDE 5 MG/1
5 TABLET ORAL 3 TIMES DAILY
Status: DISCONTINUED | OUTPATIENT
Start: 2024-08-05 | End: 2024-08-06 | Stop reason: HOSPADM

## 2024-08-05 RX ORDER — PANTOPRAZOLE SODIUM 40 MG/1
40 TABLET, DELAYED RELEASE ORAL
Status: DISCONTINUED | OUTPATIENT
Start: 2024-08-05 | End: 2024-08-06 | Stop reason: HOSPADM

## 2024-08-05 RX ORDER — ACETAMINOPHEN 160 MG/5ML
650 SOLUTION ORAL EVERY 8 HOURS
Status: DISCONTINUED | OUTPATIENT
Start: 2024-08-05 | End: 2024-08-06 | Stop reason: HOSPADM

## 2024-08-05 RX ADMIN — SODIUM CHLORIDE 2 G: 9 INJECTION, SOLUTION INTRAVENOUS at 08:21

## 2024-08-05 RX ADMIN — HYDROMORPHONE HYDROCHLORIDE 0.5 MG: 1 INJECTION, SOLUTION INTRAMUSCULAR; INTRAVENOUS; SUBCUTANEOUS at 09:58

## 2024-08-05 RX ADMIN — Medication 0.5 G: at 08:18

## 2024-08-05 RX ADMIN — TRAZODONE HYDROCHLORIDE 100 MG: 100 TABLET ORAL at 21:25

## 2024-08-05 RX ADMIN — MIDAZOLAM HYDROCHLORIDE 1 MG: 1 INJECTION, SOLUTION INTRAMUSCULAR; INTRAVENOUS at 08:01

## 2024-08-05 RX ADMIN — DEXMEDETOMIDINE HYDROCHLORIDE IN 0.9% SODIUM CHLORIDE 8 MCG: 4 INJECTION INTRAVENOUS at 08:43

## 2024-08-05 RX ADMIN — BUSPIRONE HYDROCHLORIDE 5 MG: 5 TABLET ORAL at 21:25

## 2024-08-05 RX ADMIN — ONDANSETRON 4 MG: 2 INJECTION INTRAMUSCULAR; INTRAVENOUS at 08:21

## 2024-08-05 RX ADMIN — OXYCODONE 10 MG: 5 TABLET ORAL at 21:25

## 2024-08-05 RX ADMIN — DEXAMETHASONE SODIUM PHOSPHATE 8 MG: 4 INJECTION, SOLUTION INTRAMUSCULAR; INTRAVENOUS at 08:21

## 2024-08-05 RX ADMIN — HYDROCODONE BITARTRATE AND ACETAMINOPHEN 1 TABLET: 7.5; 325 TABLET ORAL at 15:05

## 2024-08-05 RX ADMIN — FENTANYL CITRATE 50 MCG: 50 INJECTION, SOLUTION INTRAMUSCULAR; INTRAVENOUS at 08:15

## 2024-08-05 RX ADMIN — PROPOFOL 160 MG: 10 INJECTION, EMULSION INTRAVENOUS at 08:15

## 2024-08-05 RX ADMIN — OXYCODONE 10 MG: 5 TABLET ORAL at 12:02

## 2024-08-05 RX ADMIN — SUGAMMADEX 200 MG: 100 INJECTION, SOLUTION INTRAVENOUS at 09:45

## 2024-08-05 RX ADMIN — OXYCODONE HYDROCHLORIDE 5 MG: 5 TABLET ORAL at 10:25

## 2024-08-05 RX ADMIN — CYANOCOBALAMIN TAB 500 MCG 1000 MCG: 500 TAB at 11:48

## 2024-08-05 RX ADMIN — SODIUM CHLORIDE, POTASSIUM CHLORIDE, SODIUM LACTATE AND CALCIUM CHLORIDE 9 ML/HR: 600; 310; 30; 20 INJECTION, SOLUTION INTRAVENOUS at 07:07

## 2024-08-05 RX ADMIN — OXYCODONE 10 MG: 5 TABLET ORAL at 18:00

## 2024-08-05 RX ADMIN — NYSTATIN: 100000 POWDER TOPICAL at 21:24

## 2024-08-05 RX ADMIN — GABAPENTIN 800 MG: 300 CAPSULE ORAL at 21:24

## 2024-08-05 RX ADMIN — ACETAMINOPHEN 1000 MG: 500 TABLET ORAL at 07:07

## 2024-08-05 RX ADMIN — BUSPIRONE HYDROCHLORIDE 5 MG: 5 TABLET ORAL at 15:05

## 2024-08-05 RX ADMIN — LIDOCAINE HYDROCHLORIDE 40 MG: 20 INJECTION, SOLUTION EPIDURAL; INFILTRATION; INTRACAUDAL; PERINEURAL at 09:26

## 2024-08-05 RX ADMIN — HYDROMORPHONE HYDROCHLORIDE 0.5 MG: 1 INJECTION, SOLUTION INTRAMUSCULAR; INTRAVENOUS; SUBCUTANEOUS at 10:15

## 2024-08-05 RX ADMIN — CARVEDILOL 12.5 MG: 6.25 TABLET, FILM COATED ORAL at 18:00

## 2024-08-05 RX ADMIN — Medication 400 MCG: at 11:46

## 2024-08-05 RX ADMIN — LIDOCAINE HYDROCHLORIDE 60 MG: 20 INJECTION, SOLUTION EPIDURAL; INFILTRATION; INTRACAUDAL; PERINEURAL at 08:15

## 2024-08-05 RX ADMIN — FENTANYL CITRATE 50 MCG: 50 INJECTION, SOLUTION INTRAMUSCULAR; INTRAVENOUS at 09:11

## 2024-08-05 RX ADMIN — ROCURONIUM BROMIDE 30 MG: 10 INJECTION, SOLUTION INTRAVENOUS at 09:26

## 2024-08-05 RX ADMIN — ROCURONIUM BROMIDE 60 MG: 10 INJECTION, SOLUTION INTRAVENOUS at 08:15

## 2024-08-05 RX ADMIN — DEXMEDETOMIDINE HYDROCHLORIDE IN 0.9% SODIUM CHLORIDE 8 MCG: 4 INJECTION INTRAVENOUS at 09:34

## 2024-08-05 RX ADMIN — SCOPALAMINE 1 PATCH: 1 PATCH, EXTENDED RELEASE TRANSDERMAL at 07:07

## 2024-08-05 RX ADMIN — SODIUM CHLORIDE 2000 MG: 9 INJECTION, SOLUTION INTRAVENOUS at 15:05

## 2024-08-05 NOTE — PLAN OF CARE
Goal Outcome Evaluation:   New admit this shift from surgery. Pain treated per MAR. Possible d/c home tomorrow. No needs at this time. Will continue current plan of care.

## 2024-08-05 NOTE — H&P
[facility]   HISTORY AND PHYSICAL    Patient Name: Caro Kevin  : 1965  MRN: [id number]  Primary Care Physician:  Autumn Harman APRN  Date of admission: 2024    Subjective   Subjective     Chief Complaint: Arm pain and numbness.      History of Present Illness  Previous cervical 3 to cervical 5 fusion.  Stenosis at C5-6 with possible signal change.  Desires decompression in an attempt to reduce the symptoms as well as protect her spinal cord from further injury.      Review of Systems   Musculoskeletal:  Positive for neck pain.   Neurological:  Positive for weakness and numbness.        Personal History     Past Medical History:   Diagnosis Date    Abnormal EKG     Arthritis 2000    Cancer 2004    UTERINE HAD TOTAL HYSTERECTOMY    Cervical disc disorder     Cirrhosis     DENIES ANY CURRENT ISSUES.    CTS (carpal tunnel syndrome)     Diabetes mellitus     HISTORY OF REPORTS LOST WEIGHT AND IS NO LONGER ON ANY  MEDICATAIONS AND DOES NOT CHECK BS    Headache     History of chest pain     denies any current chest pain    Hypertension     SAW DR SHIPMAN  HAD CARDIAC TESTING WAS (-) AND SHE WAS RELEASED    Iron deficiency anemia     Leukopenia     PT REPORTS CHRONIC ISSUE AND WAS TOLD BY DR JOEY GODFREY HEPATOLOGIST [facility]    Low back pain     Pain management     Replaced by [facility]    Peripheral neuropathy     PONV (postoperative nausea and vomiting)     Sleep apnea     Venous stasis     BILATERALLY LEGS       Past Surgical History:   Procedure Laterality Date    CHOLECYSTECTOMY      COLONOSCOPY      CYST REMOVAL      FROM BEHIND LEFT EAR    ENDOSCOPY      EPIDURAL BLOCK  2017    EYE SURGERY      CATARACT SURGERY BILATERLALLY    GASTRIC SLEEVE LAPAROSCOPIC      2017    HYSTERECTOMY      NECK SURGERY  2007    SPINAL FUSION  2007    TONSILLECTOMY      TRIGGER POINT INJECTION  2017       Family History: family history includes Alcohol abuse in her brother; Anxiety  disorder in her brother; COPD in her mother; Cancer in her father and mother; Depression in her brother and brother; Diabetes in her brother, father, and sister; Drug abuse in her brother; Early death in her father and sister; Hearing loss in her father; Hyperlipidemia in her brother, father, sister, sister, and sister; Kidney disease in her father; Liver disease in her brother; Mental illness in her brother and brother; Miscarriages / Stillbirths in her mother and sister; Vision loss in her father, mother, and sister. Otherwise pertinent FHx was reviewed and not pertinent to current issue.    Social History:  reports that she has never smoked. She has been exposed to tobacco smoke. She has never used smokeless tobacco. She reports that she does not currently use alcohol. She reports that she does not currently use drugs after having used the following drugs: Marijuana.    Home Medications:  Semaglutide (1 MG/DOSE), busPIRone, carvedilol, cyclobenzaprine, desvenlafaxine, folic acid, gabapentin, naproxen, omeprazole, ondansetron, oxyCODONE, traZODone, and vitamin B-12    Allergies:  No Known Allergies    Objective    Objective     Vitals:   Temp:  [96.9 °F (36.1 °C)] 96.9 °F (36.1 °C)  Heart Rate:  [80] 80  Resp:  [18] 18  BP: (123)/(70) 123/70        Assessment & Plan   Assessment / Plan     Brief Patient Summary:  Caro Kevin is a 59 y.o. female who has cervical spinal stenosis below previous fusion.    Active Hospital Problems:  Active Hospital Problems    Diagnosis     Spinal stenosis in cervical region     History of fusion of cervical spine      Plan:   OR today for anterior cervical discectomy and fusion using allograft bone and instrumentation, right approach, cervical 5-cervical 6.  Risk and benefits discussed with patient.  Desires to proceed.    VTE Prophylaxis:  Mechanical VTE prophylaxis orders are present.        CODE STATUS:       Admission Status:  I believe this patient meets outpatient in a bed  status.    Richy Joshi MD

## 2024-08-05 NOTE — ANESTHESIA POSTPROCEDURE EVALUATION
Patient: Caro Kevin    Procedure Summary       Date: 08/05/24 Room / Location: [facility] OR 05 / [facility] MAIN OR    Anesthesia Start: 0809 Anesthesia Stop: 1002    Procedure: ANTERIOR CERVICAL DISCECTOMY AND FUSION USING ALLOGRAFT BONE AND INSTRUMENTATION, right approach, cervical 5-cervical 6 with removal of outside instrumentation (Right: Spine Cervical) Diagnosis:       Spinal stenosis in cervical region      History of fusion of cervical spine      (Spinal stenosis in cervical region [M48.02])      (History of fusion of cervical spine [Z98.1])    Surgeons: Richy Joshi MD Provider: Terry Singh MD    Anesthesia Type: general ASA Status: 3            Anesthesia Type: general    Vitals  Vitals Value Taken Time   /67 08/05/24 1026   Temp 36 °C (96.8 °F) 08/05/24 1030   Pulse 61 08/05/24 1030   Resp 15 08/05/24 1030   SpO2 97 % 08/05/24 1030           Post Anesthesia Care and Evaluation    Patient location during evaluation: bedside  Patient participation: complete - patient participated  Level of consciousness: awake  Pain management: adequate    Airway patency: patent  PONV Status: none  Cardiovascular status: acceptable and stable  Respiratory status: acceptable  Hydration status: acceptable

## 2024-08-05 NOTE — ANESTHESIA PREPROCEDURE EVALUATION
Anesthesia Evaluation     Patient summary reviewed and Nursing notes reviewed   history of anesthetic complications:  PONV  NPO Solid Status: > 8 hours  NPO Liquid Status: > 2 hours           Airway   Mallampati: II  TM distance: >3 FB  Neck ROM: full  No difficulty expected and Large neck circumference  Dental      Pulmonary - normal exam    breath sounds clear to auscultation  (+) a smoker vape,sleep apnea (not currently on CPAP, recently had new sleep study after weight loss)  Cardiovascular - normal exam  Exercise tolerance: good (4-7 METS)    ECG reviewed  Rhythm: regular  Rate: normal    (+) hypertension, hyperlipidemia      Neuro/Psych  (+) headaches  GI/Hepatic/Renal/Endo    (+) obesity, GERD well controlled, liver disease (fatty liver dz, no current issues) cirrhosis, diabetes mellitus    Musculoskeletal     Abdominal    Substance History - negative use     OB/GYN negative ob/gyn ROS         Other   arthritis,     ROS/Med Hx Other: <4METS,HX DEC. MOBILITY.HX HTN,DM,CIRRHOSIS. CARDS W/U FOR C/P, ABN EKG: ECHO 9/11/23 EF 55%,NO VALVE STENOSIS,STRESS 5/26/23 NO ISCHEMIA. KT    Last Ozempic was three weeks ago              Anesthesia Plan    ASA 3     general     (Patient understands anesthesia not responsible for dental damage.)  intravenous induction     Anesthetic plan, risks, benefits, and alternatives have been provided, discussed and informed consent has been obtained with: patient.    Use of blood products discussed with patient .    Plan discussed with CRNA.    CODE STATUS:

## 2024-08-05 NOTE — OP NOTE
CERVICAL DISCECTOMY ANTERIOR WITH FUSION  Procedure Report    Patient Name:  Caro Kevin  YOB: 1965    Date of Surgery:  8/5/2024     Indications: Cervical 5 6 stenosis with previous cervical 3 to cervical 5 fusion    Pre-op Diagnosis:   Spinal stenosis in cervical region [M48.02]  History of fusion of cervical spine [Z98.1]       Post-Op Diagnosis Codes:     * Spinal stenosis in cervical region [M48.02]     * History of fusion of cervical spine [Z98.1]    Procedure/CPT® Codes:      Procedure(s):  ANTERIOR CERVICAL DISCECTOMY AND FUSION USING ALLOGRAFT BONE AND INSTRUMENTATION, right approach, cervical 5-cervical 6 with removal of outside instrumentation from cervical 3 to cervical 5    Staff:  Surgeon(s):  Richy Joshi MD    Assistant: Rose Mary Lo RN CSA    Anesthesia: General    Estimated Blood Loss: 30 mL      Specimen:          None        Findings: Apparent solid fusion from cervical 3 to cervical 5 with disc osteophyte causing stenosis at cervical 5 to cervical 6    Complications: No apparent intraoperative complications    Description of Procedure: After informed consent was obtained, the patient was brought to the operating room.  After the induction of adequate general endotracheal anesthesia, the patient was placed in the supine position.  A small bump was placed under the neck and the head was placed on a donut head martins.  The neck was prepped and draped in typical fashion.  A timeout was performed.  The surgical level was localized using the C arm.  A transverse skin crease was divided and taken down through the platysma.  The platysma was undermined superiorly and inferiorly.  An avascular plane was then created medial to the carotid artery.  Dissection was continued down to the anterior cervical spine.  The anterior cervical spine was cleared of soft tissue using a Kitner sponge.  The plate which spanned from C3-C5 was cleared of soft tissue using the Bovie  electrocautery.  The shadow line retractor was placed.  The longus coli was dissected from C5 and C6.  The existing plate was removed using the universal removal tray.  There appeared to be a solid fusion from C3-C5.  A distraction pin was then placed at C5 and C6 and the level confirmed with fluoroscopy.  The disc space was then distracted across.  The disc base was incised and disc material removed.  The curette was used to remove the disc down to the posterior osteophyte the osteophyte was undercut using a 1 mm Kerrison punch, decompressing from uncovertebral to uncovertebral joint.  The micro nerve hook was then used to elevate the posterior longitudinal ligament which was removed using a thin footplate Kerrison from uncovertebral to uncovertebral joint.  Hemostasis was assured and the disc base.  The disc space defect was sized using the VG-2 sizers.  A 5 x 7 graft was seen to be appropriate fit.  The curette and the rasp were used to further prepare the endplate.  The bone graft was rinsed and reconstituted using normal saline.  The bone graft was tamped into the disc space defect slightly countersinking.  Distraction pins at C5 and C6 were removed and the holes left by the distraction pins filled bone wax.  A size 14 plate was chosen.  [place] instrumentation from [employer] was used for the case.  An oversized screw was placed at C5 into one of the existing screw holes.  12 mm screws were then placed in the C6 vertebral body after using the awl to penetrate the cortex.  An additional 12 mm screw was placed at C5.  After securing the screws firmly to the plate, the cam screws were tightened.    The shadow line retractor was removed and hemostasis was assured and the soft tissue.  The wound was irrigated with normal saline.  The platysma was reapproximated using a running 2-0 Vicryl followed by a subcutaneous 4-0 Monocryl to reapproximate the skin edges.  The wound was dressed with Mastisol, Steri-Strips, Telfa and  Tegaderm.  There were no apparent intraoperative complications.  All sponge and needle counts were correct.  The patient received a dose of preoperative antibiotics.     Spinal Surgery Levels Completed:1 Level     Assistant: Rose Mary Lo RN CSA  was responsible for performing the following activities: Retraction, Suction, Irrigation, and Placing Dressing and their skilled assistance was necessary for the success of this case.    Richy Joshi MD     Date: 8/5/2024  Time: 10:07 EDT

## 2024-08-06 VITALS
BODY MASS INDEX: 34.06 KG/M2 | SYSTOLIC BLOOD PRESSURE: 131 MMHG | HEART RATE: 64 BPM | OXYGEN SATURATION: 100 % | DIASTOLIC BLOOD PRESSURE: 58 MMHG | HEIGHT: 60 IN | WEIGHT: 173.5 LBS | TEMPERATURE: 97.3 F | RESPIRATION RATE: 18 BRPM

## 2024-08-06 PROBLEM — M48.02 SPINAL STENOSIS IN CERVICAL REGION: Status: RESOLVED | Noted: 2024-06-27 | Resolved: 2024-08-06

## 2024-08-06 LAB
QT INTERVAL: 423 MS
QTC INTERVAL: 443 MS

## 2024-08-06 PROCEDURE — 25010000002 CEFAZOLIN PER 500 MG: Performed by: NEUROLOGICAL SURGERY

## 2024-08-06 PROCEDURE — 99024 POSTOP FOLLOW-UP VISIT: CPT | Performed by: NEUROLOGICAL SURGERY

## 2024-08-06 PROCEDURE — 25010000002 HYDROMORPHONE 1 MG/ML SOLUTION: Performed by: NEUROLOGICAL SURGERY

## 2024-08-06 RX ORDER — OXYCODONE HYDROCHLORIDE AND ACETAMINOPHEN 5; 325 MG/1; MG/1
1 TABLET ORAL EVERY 6 HOURS PRN
Qty: 20 TABLET | Refills: 0 | Status: SHIPPED | OUTPATIENT
Start: 2024-08-06 | End: 2024-08-08

## 2024-08-06 RX ADMIN — GABAPENTIN 800 MG: 300 CAPSULE ORAL at 07:20

## 2024-08-06 RX ADMIN — BUSPIRONE HYDROCHLORIDE 5 MG: 5 TABLET ORAL at 08:07

## 2024-08-06 RX ADMIN — HYDROMORPHONE HYDROCHLORIDE 0.25 MG: 1 INJECTION, SOLUTION INTRAMUSCULAR; INTRAVENOUS; SUBCUTANEOUS at 02:35

## 2024-08-06 RX ADMIN — CYCLOBENZAPRINE HYDROCHLORIDE 10 MG: 10 TABLET, FILM COATED ORAL at 02:35

## 2024-08-06 RX ADMIN — NYSTATIN: 100000 POWDER TOPICAL at 08:08

## 2024-08-06 RX ADMIN — OXYCODONE 10 MG: 5 TABLET ORAL at 08:07

## 2024-08-06 RX ADMIN — ACETAMINOPHEN 650 MG: 325 TABLET ORAL at 07:21

## 2024-08-06 RX ADMIN — CYANOCOBALAMIN TAB 500 MCG 1000 MCG: 500 TAB at 08:07

## 2024-08-06 RX ADMIN — HYDROCODONE BITARTRATE AND ACETAMINOPHEN 1 TABLET: 7.5; 325 TABLET ORAL at 00:03

## 2024-08-06 RX ADMIN — SODIUM CHLORIDE 2000 MG: 9 INJECTION, SOLUTION INTRAVENOUS at 00:02

## 2024-08-06 RX ADMIN — PANTOPRAZOLE SODIUM 40 MG: 40 TABLET, DELAYED RELEASE ORAL at 07:21

## 2024-08-06 RX ADMIN — Medication 400 MCG: at 08:38

## 2024-08-06 RX ADMIN — CARVEDILOL 12.5 MG: 6.25 TABLET, FILM COATED ORAL at 08:07

## 2024-08-06 NOTE — PROGRESS NOTES
[facility]   Neurosurgery Progress Note    Patient Name: Caro Kevin  : 1965  MRN: [id number]  Date of admission: 2024  Surgical Procedures Since Admission:  Procedure(s):  ANTERIOR CERVICAL DISCECTOMY AND FUSION USING ALLOGRAFT BONE AND INSTRUMENTATION, right approach, cervical 5-cervical 6 with removal of outside instrumentation  Surgeon:  Richy Joshi MD  Status:  1 Day Post-Op  -------------------    Subjective   Subjective     Chief Complaint: Postop day 1 from ACDF, neck pain    History of Present Illness   Reports her balance is improved.  She has neck pain.  She has been able to take some p.o.  She has ambulated to the bathroom but not outside of her room.      Objective   Objective     Vitals:   Temp:  [96.8 °F (36 °C)-97.7 °F (36.5 °C)] 97.7 °F (36.5 °C)  Heart Rate:  [58-78] 58  Resp:  [12-16] 16  BP: (100-142)/(60-98) 142/90  Flow (L/min):  [0-2] 0    Awake and alert.  Dressing clean/dry/intact.  Voice is strong.  Arm movement normal.     Assessment & Plan   Assessment / Plan     Brief Patient Summary:  Caro Kevin is a 59 y.o. female who is status post ACDF for spinal stenosis with myelomalacia.    Active Hospital Problems:  Active Hospital Problems    Diagnosis     **Status post cervical spinal fusion     Spinal stenosis in cervical region     History of fusion of cervical spine      Plan:   Up today.  Try to ambulate into the hallway.  If her pain is adequately controlled she could potentially be discharged later today.  If not we will hopefully pursue discharge tomorrow.

## 2024-08-06 NOTE — PLAN OF CARE
Goal Outcome Evaluation:     PRN pain medications given per MAR. Ambulated to restroom with standby assistance, no issues noted. VSS.

## 2024-08-08 ENCOUNTER — TELEPHONE (OUTPATIENT)
Dept: NEUROSURGERY | Facility: CLINIC | Age: 59
End: 2024-08-08
Payer: MEDICARE

## 2024-08-08 DIAGNOSIS — Z98.1 HISTORY OF FUSION OF CERVICAL SPINE: Primary | ICD-10-CM

## 2024-08-08 RX ORDER — OXYCODONE HYDROCHLORIDE 5 MG/1
5 CAPSULE ORAL EVERY 6 HOURS PRN
Qty: 20 CAPSULE | Refills: 0 | Status: SHIPPED | OUTPATIENT
Start: 2024-08-08 | End: 2024-08-08

## 2024-08-08 NOTE — TELEPHONE ENCOUNTER
[facility] called and adv they do not carry capsules they would need a new prescription for tablets to be sent in

## 2024-08-08 NOTE — TELEPHONE ENCOUNTER
Pharmacy called back advised that they do not have tablets for jose.     They are requesting a new rx for just plain oxycodone be sent in.

## 2024-08-08 NOTE — TELEPHONE ENCOUNTER
Caro is requesting a different type of pain medication. She did not pick-up the oxyCODONE-acetaminophen. Patient stated that she needs something without Tylenol in it because she has Cirrhosis of the liver.   Caro requested oxyCODONE-wo/acetaminophen.

## 2024-08-08 NOTE — TELEPHONE ENCOUNTER
Patient has been notified of medication being sent to [facility]

## 2024-08-12 ENCOUNTER — TELEPHONE (OUTPATIENT)
Dept: NEUROSURGERY | Facility: CLINIC | Age: 59
End: 2024-08-12
Payer: MEDICARE

## 2024-08-12 NOTE — TELEPHONE ENCOUNTER
Patient had sx on 8/5/2024 and was advised to stop her Ozempic before sx. She wanted to know when it would be ok to restart her medication.

## 2024-08-12 NOTE — TELEPHONE ENCOUNTER
ADVISED IMANI THAT SHE CAN START HER OZEMPIC BACK AT ANY TIME. IT IS JUST RECOMMENDED TO STOP 7 DAYS PRIOR TO SX BECAUSE THE MEDICATION SLOWS THEIR STOMACH DOWN FROM EMPTYING.

## 2024-08-20 DIAGNOSIS — Z98.1 HISTORY OF FUSION OF CERVICAL SPINE: ICD-10-CM

## 2024-08-21 ENCOUNTER — TELEPHONE (OUTPATIENT)
Dept: NEUROSURGERY | Facility: CLINIC | Age: 59
End: 2024-08-21
Payer: MEDICARE

## 2024-08-21 NOTE — TELEPHONE ENCOUNTER
Patient left voicemail stating her pain medication needs prior authorization. Information submitted via Cover My Meds. Patient notified.

## 2024-08-21 NOTE — TELEPHONE ENCOUNTER
"Patient's insurance has denied her oxycodone HCL 5mg, stating it is not on formulary. Patient states it will cost $1200 to fill that prescription. Apparently, when e-scribing, if you choose oxycodone HCL with the statement \"abuse deterrent\" it specifies a different type, which is \"extremely expensive\" according to pharmacist. They state if you will just prescribe regular oxycodone hcl 5mg without any added phrases, it should be covered by insurance.         "

## 2024-08-22 DIAGNOSIS — Z98.1 HISTORY OF FUSION OF CERVICAL SPINE: Primary | ICD-10-CM

## 2024-08-22 RX ORDER — OXYCODONE HYDROCHLORIDE 5 MG/1
5 TABLET ORAL EVERY 8 HOURS PRN
Qty: 20 TABLET | Refills: 0 | Status: SHIPPED | OUTPATIENT
Start: 2024-08-22

## 2024-08-29 ENCOUNTER — OFFICE VISIT (OUTPATIENT)
Dept: NEUROSURGERY | Facility: CLINIC | Age: 59
End: 2024-08-29
Payer: MEDICARE

## 2024-08-29 VITALS
HEIGHT: 60 IN | SYSTOLIC BLOOD PRESSURE: 122 MMHG | HEART RATE: 82 BPM | BODY MASS INDEX: 32 KG/M2 | DIASTOLIC BLOOD PRESSURE: 89 MMHG | WEIGHT: 163 LBS

## 2024-08-29 DIAGNOSIS — Z98.1 STATUS POST CERVICAL SPINAL FUSION: ICD-10-CM

## 2024-08-29 DIAGNOSIS — Z98.1 HISTORY OF FUSION OF CERVICAL SPINE: ICD-10-CM

## 2024-08-29 DIAGNOSIS — M50.222 HERNIATED NUCLEUS PULPOSUS, C5-6: Primary | ICD-10-CM

## 2024-08-29 PROCEDURE — 99024 POSTOP FOLLOW-UP VISIT: CPT | Performed by: PHYSICIAN ASSISTANT

## 2024-08-29 RX ORDER — OXYCODONE HYDROCHLORIDE 5 MG/1
5 TABLET ORAL EVERY 12 HOURS PRN
Qty: 20 TABLET | Refills: 0 | Status: SHIPPED | OUTPATIENT
Start: 2024-08-29

## 2024-08-29 NOTE — PROGRESS NOTES
Patient being seen for today for Post-op  .    Subjective    Caro Kevin is a 59 y.o. female that presents with Post-op  .    HPI  Previously: She is status post ACDF using a right approach at C5-C6 with removal of outside instrumentation on 8/5/2024.  She previously complained of neck pain and bilateral arm pain to all the fingers.  She had MRI showing stenosis at C5-C6 below previous cervical fusion with questionable spinal cord signal change.    Today:  - S/P ACDF via R approach at C5-C6 w/ removal of outside instrumentation on 8/5/2024  - Prev. c/o neck pain & bilat arm pain to all fingers  - Some improvement in R arm pain, but still present  - Persistent numbness in both arms, less severe in R  - Improved balance post-op  - Difficulty swallowing  - Tenderness at incision site  - No dizziness  - Sleep disturbance due to recent apartment inspection     reports that she has never smoked. She has been exposed to tobacco smoke. She has never used smokeless tobacco.    Review of Systems   Musculoskeletal:  Positive for myalgias and neck pain.   Neurological:  Positive for weakness and numbness.       Objective   Vitals:    08/29/24 1405   BP: 122/89   Pulse: 82        Physical Exam  Constitutional:       Appearance: Normal appearance.   Pulmonary:      Effort: Pulmonary effort is normal.   Skin:     Comments: Right anterior cervical incision is well-healed without erythema or discharge   Neurological:      General: No focal deficit present.      Mental Status: She is alert and oriented to person, place, and time.      Sensory: No sensory deficit.      Motor: No weakness.      Deep Tendon Reflexes: Reflexes normal.   Psychiatric:         Mood and Affect: Mood normal.         Behavior: Behavior normal.          Result Review   None.     Assessment and Plan {CC Problem List  Visit Diagnosis  ROS  Review (Popup)  Health Maintenance  Quality  BestPractice  Medications  SmartSets  SnapShot Encounters  Media  :23}   Diagnoses and all orders for this visit:    1. Herniated nucleus pulposus, C5-6 (Primary)  -     XR Spine Cervical Complete 4 or 5 View; Future    2. Status post cervical spinal fusion  -     XR Spine Cervical Complete 4 or 5 View; Future    1. Post-op ACDF C5-C6  - Improvement noted in R arm pain & balance  - Continue current restrictions for 9 more wks:    - No lifting >10 lbs    - Limit bending/twisting    - Avoid strenuous activities  - F/U in 9 wks w/ X-ray 2-3 days prior  - Anticipate further improvement over 6-8 months  - Monitor for worsening symptoms (possible C2 involvement)    3. Rehabilitation  - Defer strength training for now  - Consider PT at next follow-up    Additional Notes:  - Discussed potential for incomplete recovery due to spinal cord involvement  - Advised to report any new redness, discharge, or incision splitting  - Plan for 3-monthly f/u w/ X-rays for 1st year post-op  - Pt to contact if symptoms worsen before f/u    Follow Up {Instructions Charge Capture  Follow-up Communications :23}   Return in about 9 weeks (around 10/31/2024).

## 2024-09-03 ENCOUNTER — TELEPHONE (OUTPATIENT)
Dept: NEUROSURGERY | Facility: CLINIC | Age: 59
End: 2024-09-03
Payer: MEDICARE

## 2024-09-03 DIAGNOSIS — Z98.1 HISTORY OF FUSION OF CERVICAL SPINE: ICD-10-CM

## 2024-09-03 RX ORDER — OXYCODONE HYDROCHLORIDE 5 MG/1
5 TABLET ORAL EVERY 12 HOURS PRN
Qty: 20 TABLET | Refills: 0 | Status: SHIPPED | OUTPATIENT
Start: 2024-09-03

## 2024-09-11 DIAGNOSIS — Z98.1 HISTORY OF FUSION OF CERVICAL SPINE: ICD-10-CM

## 2024-09-11 RX ORDER — OXYCODONE HYDROCHLORIDE 5 MG/1
5 TABLET ORAL EVERY 12 HOURS PRN
Qty: 20 TABLET | Refills: 0 | Status: SHIPPED | OUTPATIENT
Start: 2024-09-13

## 2024-09-11 NOTE — TELEPHONE ENCOUNTER
Patient left a voicemail stating she was checking on prescription request sent by her pharmacy yesterday, 9-10-24. Attempted to contact her back as we do not have any requests from the pharmacy. No answer and no voicemail . I'm assuming she is trying to refill the roxicodone.

## 2024-09-20 DIAGNOSIS — Z98.1 HISTORY OF FUSION OF CERVICAL SPINE: ICD-10-CM

## 2024-09-20 RX ORDER — OXYCODONE HYDROCHLORIDE 5 MG/1
5 TABLET ORAL EVERY 12 HOURS PRN
Qty: 20 TABLET | Refills: 0 | Status: SHIPPED | OUTPATIENT
Start: 2024-09-20

## 2024-09-24 ENCOUNTER — TELEPHONE (OUTPATIENT)
Dept: NEUROSURGERY | Facility: CLINIC | Age: 59
End: 2024-09-24
Payer: MEDICARE

## 2024-09-24 ENCOUNTER — HOSPITAL ENCOUNTER (OUTPATIENT)
Dept: OTHER | Facility: HOSPITAL | Age: 59
Discharge: HOME | End: 2024-09-24

## 2024-09-25 DIAGNOSIS — Z98.1 STATUS POST CERVICAL SPINAL FUSION: ICD-10-CM

## 2024-09-25 DIAGNOSIS — M50.222 HERNIATED NUCLEUS PULPOSUS, C5-6: ICD-10-CM

## 2024-09-27 ENCOUNTER — TELEPHONE (OUTPATIENT)
Dept: NEUROSURGERY | Facility: CLINIC | Age: 59
End: 2024-09-27
Payer: MEDICARE

## 2024-09-27 DIAGNOSIS — Z98.1 HISTORY OF FUSION OF CERVICAL SPINE: ICD-10-CM

## 2024-09-27 RX ORDER — OXYCODONE HYDROCHLORIDE 5 MG/1
5 TABLET ORAL EVERY 12 HOURS PRN
Qty: 20 TABLET | Refills: 0 | Status: SHIPPED | OUTPATIENT
Start: 2024-09-27

## 2024-09-27 NOTE — TELEPHONE ENCOUNTER
Caller: PATIENT    Relationship: SELF    Best call back number:[phone]    Caller requesting test results: PATIENT    What test was performed: XR Spine Cervical Complete 4 or 5 View     When was the test performed: 09/25/24    [facility]    Additional notes: PATIENT CALLED AND STATES THAT SHE IS A LOT OF PAIN-PT IS WANTING TO GET RESULTS FROM IMAGING-PATIENT STATES THAT SHE DOES NOT WANT TO GO THROUGH ANOTHER WEEKEND IN PAIN-PATIENT IS ASKING FOR A CALL BACK TO [facility] OF SOMETHING THAT SHE CAN DO TO HELP WITH PAIN-SENDING TO OFFICE TO [facility]ISE THANK YOU

## 2024-09-30 NOTE — TELEPHONE ENCOUNTER
I sent pain meds last Friday. Her xray report looks fine at the surgery site, but they mention an abnormality at C1-2. I need to see the films and possibly order a cervical MRI. Is she only taking the Oxycodone or is she taking anything else for the pain?

## 2024-09-30 NOTE — TELEPHONE ENCOUNTER
Pt called in requesting an update. Adv providers are out of the office on Mondays and Dr. Joshi is in surgery. Adv once we receive a response will call to update as soon as possible

## 2024-10-01 NOTE — TELEPHONE ENCOUNTER
Films looks as one would expect after her surgery. Her last MRI showed some degenerative changes at C1-2 also. Keep her scheduled f/u. Also still receiving 120 oxycodone 10 per month. I expect there is a degree of tolerance to her pain medication.

## 2024-10-01 NOTE — TELEPHONE ENCOUNTER
Patient advised. She has other questions regarding imaging and pain. Follow up appointment scheduled for 10-8-24 with Dr. Joshi to discuss.

## 2024-10-08 ENCOUNTER — OFFICE VISIT (OUTPATIENT)
Dept: NEUROSURGERY | Facility: CLINIC | Age: 59
End: 2024-10-08
Payer: MEDICARE

## 2024-10-08 VITALS
WEIGHT: 167.7 LBS | HEIGHT: 60 IN | DIASTOLIC BLOOD PRESSURE: 82 MMHG | BODY MASS INDEX: 32.93 KG/M2 | SYSTOLIC BLOOD PRESSURE: 134 MMHG

## 2024-10-08 DIAGNOSIS — Z98.1 HISTORY OF FUSION OF CERVICAL SPINE: ICD-10-CM

## 2024-10-08 DIAGNOSIS — Z98.1 STATUS POST CERVICAL SPINAL FUSION: Primary | ICD-10-CM

## 2024-10-08 PROCEDURE — 1160F RVW MEDS BY RX/DR IN RCRD: CPT | Performed by: NEUROLOGICAL SURGERY

## 2024-10-08 PROCEDURE — 1159F MED LIST DOCD IN RCRD: CPT | Performed by: NEUROLOGICAL SURGERY

## 2024-10-08 PROCEDURE — 99024 POSTOP FOLLOW-UP VISIT: CPT | Performed by: NEUROLOGICAL SURGERY

## 2024-10-08 RX ORDER — OXYCODONE HYDROCHLORIDE 5 MG/1
5 TABLET ORAL EVERY 12 HOURS PRN
Qty: 20 TABLET | Refills: 0 | Status: SHIPPED | OUTPATIENT
Start: 2024-10-08

## 2024-10-08 RX ORDER — OXYCODONE HYDROCHLORIDE 10 MG/1
10 TABLET ORAL EVERY 6 HOURS PRN
COMMUNITY

## 2024-10-08 NOTE — PROGRESS NOTES
Caro Kevin is a 59 y.o. female that presents with cervical radiculopathy       She has continued neck pain since her surgery. She reports her stumbling and balance are greatly improved. She had repeat spine films. The symptoms thought to be carpal tunnel in the right arm have also improved. She still has some swallowing issues.          Review of Systems   HENT:  Positive for trouble swallowing.    Musculoskeletal:  Positive for myalgias and neck pain.        Vitals:    10/08/24 0904   BP: 134/82      She has some persistent Hoffmans bilaterally. Gait stable.    She had x-rays which show the typical findings after ACDF. The radiologist mentions an abnormality at C1-2 (on reviewing the MRI this appears to be present on last MRI).        Assessment and Plan {CC Problem List  Visit Diagnosis  ROS  Review (Popup)  Health Maintenance  Quality  BestPractice  Medications  SmartSets  SnapShot Encounters  Media :23}   Problem List Items Addressed This Visit       History of fusion of cervical spine    Relevant Medications    oxyCODONE (Roxicodone) 5 MG immediate release tablet    Status post cervical spinal fusion - Primary    Relevant Orders    XR spine cervical ap and lat w flex and ext       Her symptoms of myelopathy are improving, but still with neck pain. She is now 2 months out from ACDF. I will see her back in one month and check flex-extension films on f/u.    Follow Up {Instructions Charge Capture  Follow-up Communications :23}   Return in about 1 month (around 11/8/2024).

## 2024-10-15 DIAGNOSIS — Z98.1 HISTORY OF FUSION OF CERVICAL SPINE: ICD-10-CM

## 2024-10-15 RX ORDER — OXYCODONE HYDROCHLORIDE 5 MG/1
5 TABLET ORAL EVERY 12 HOURS PRN
Qty: 20 TABLET | OUTPATIENT
Start: 2024-10-15

## 2024-10-18 ENCOUNTER — TELEPHONE (OUTPATIENT)
Dept: NEUROSURGERY | Facility: CLINIC | Age: 59
End: 2024-10-18
Payer: MEDICARE

## 2024-10-18 NOTE — TELEPHONE ENCOUNTER
"Pt left voicemail stating she is \"in a lot of pain and needs her medication (oxycodone 5) increased, please advise  "

## 2024-10-22 DIAGNOSIS — Z98.1 HISTORY OF FUSION OF CERVICAL SPINE: ICD-10-CM

## 2024-10-22 RX ORDER — OXYCODONE HYDROCHLORIDE 5 MG/1
5 TABLET ORAL EVERY 12 HOURS PRN
Qty: 20 TABLET | Refills: 0 | Status: SHIPPED | OUTPATIENT
Start: 2024-10-22

## 2024-11-05 ENCOUNTER — HOSPITAL ENCOUNTER (OUTPATIENT)
Dept: GENERAL RADIOLOGY | Facility: HOSPITAL | Age: 59
Discharge: HOME | End: 2024-11-05
Admitting: NEUROLOGICAL SURGERY
Payer: MEDICARE

## 2024-11-05 ENCOUNTER — OFFICE VISIT (OUTPATIENT)
Dept: NEUROSURGERY | Facility: CLINIC | Age: 59
End: 2024-11-05
Payer: MEDICARE

## 2024-11-05 VITALS — BODY MASS INDEX: 33.44 KG/M2 | WEIGHT: 170.3 LBS | HEIGHT: 60 IN

## 2024-11-05 DIAGNOSIS — Z98.1 STATUS POST CERVICAL SPINAL FUSION: ICD-10-CM

## 2024-11-05 DIAGNOSIS — Z98.1 HISTORY OF FUSION OF CERVICAL SPINE: Primary | ICD-10-CM

## 2024-11-05 PROCEDURE — 72050 X-RAY EXAM NECK SPINE 4/5VWS: CPT

## 2024-11-05 NOTE — PROGRESS NOTES
Caro Kevin is a 59 y.o. female that presents with S/P cervical spinal fusion       Her current pain is 8/10. She has been on Oxycodone at her current dose for 3 years. Her TENS has helped some. She has difficulty placing the pads as she lives alone.        Review of Systems   Musculoskeletal:  Positive for neck pain.          Physical Exam  Constitutional:       Comments: BMI 33.26   Pulmonary:      Effort: Pulmonary effort is normal.   Neurological:      Mental Status: She is alert.      Motor: No weakness.      Deep Tendon Reflexes: Reflexes abnormal (2/4, ? minimal Hoffmans).   Psychiatric:         Mood and Affect: Mood normal.           Assessment and Plan {CC Problem List  Visit Diagnosis  ROS  Review (Popup)  Health Maintenance  Quality  BestPractice  Medications  SmartSets  SnapShot Encounters  Media :23}   Problem List Items Addressed This Visit       History of fusion of cervical spine - Primary     She will get her flex-ext films of the cervical spine.     Patients who like heat often benefit capsaicin ointment and patients who like cold often benefit from Biofreeze.     Follow Up {Instructions Charge Capture  Follow-up Communications :23}   No follow-ups on file.

## 2024-11-08 ENCOUNTER — TELEPHONE (OUTPATIENT)
Dept: NEUROSURGERY | Facility: CLINIC | Age: 59
End: 2024-11-08
Payer: MEDICARE

## 2024-11-08 NOTE — TELEPHONE ENCOUNTER
----- Message from Richy Joshi sent at 11/8/2024  9:08 AM EST -----  X-rays look ok, without evidence of instability.

## 2024-11-08 NOTE — TELEPHONE ENCOUNTER
Attempted to contact patient, but unable to leave voicemail. [employer] message sent regarding. Ok for hub to relay.

## (undated) DEVICE — STERILE POLYISOPRENE POWDER-FREE SURGICAL GLOVES WITH EMOLLIENT COATING: Brand: PROTEXIS

## (undated) DEVICE — SLV SCD KN/LEN ADJ EXPRSS BLENDED MD 1P/U

## (undated) DEVICE — GAMMEX® NON-LATEX SIZE 7.5, STERILE NEOPRENE POWDER-FREE SURGICAL GLOVE: Brand: GAMMEX

## (undated) DEVICE — GLV SURG BIOGEL LTX PF 7 1/2

## (undated) DEVICE — LAMINECTOMY CERVICAL DISC-LF: Brand: MEDLINE INDUSTRIES, INC.

## (undated) DEVICE — BANDAGE,GAUZE,BULKEE II,4.5"X4.1YD,STRL: Brand: MEDLINE

## (undated) DEVICE — SUT VIC 2/0 CT1 36IN

## (undated) DEVICE — SUT MNCRYL PLS ANTIB UD 4/0 PS2 18IN

## (undated) DEVICE — DISTRACT SCRW 12MM STRL

## (undated) DEVICE — DRP MICROSCP LECIA W/CLEARLENS 137X381CM

## (undated) DEVICE — RETRACTION ASPIRATOR: Brand: FLOWTRIEVER

## (undated) DEVICE — ELECTRD BLD EDGE/INSUL1P SFTY SLV 2.75IN